# Patient Record
Sex: FEMALE | Race: WHITE
[De-identification: names, ages, dates, MRNs, and addresses within clinical notes are randomized per-mention and may not be internally consistent; named-entity substitution may affect disease eponyms.]

---
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
## 2020-11-01 ENCOUNTER — HOSPITAL ENCOUNTER (EMERGENCY)
Dept: HOSPITAL 47 - EC | Age: 20
Discharge: HOME | End: 2020-11-01
Payer: COMMERCIAL

## 2020-11-01 VITALS
DIASTOLIC BLOOD PRESSURE: 71 MMHG | HEART RATE: 72 BPM | TEMPERATURE: 98.1 F | SYSTOLIC BLOOD PRESSURE: 124 MMHG | RESPIRATION RATE: 16 BRPM
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
## 2020-11-01 DIAGNOSIS — Z79.3: ICD-10-CM

## 2020-11-01 DIAGNOSIS — S00.531A: ICD-10-CM

## 2020-11-01 DIAGNOSIS — S00.33XA: ICD-10-CM

## 2020-11-01 DIAGNOSIS — S90.31XA: ICD-10-CM

## 2020-11-01 DIAGNOSIS — S00.83XA: Primary | ICD-10-CM
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
## 2020-11-01 PROCEDURE — 96372 THER/PROPH/DIAG INJ SC/IM: CPT

## 2020-11-01 PROCEDURE — 70486 CT MAXILLOFACIAL W/O DYE: CPT

## 2020-11-01 PROCEDURE — 72125 CT NECK SPINE W/O DYE: CPT

## 2020-11-01 PROCEDURE — 99283 EMERGENCY DEPT VISIT LOW MDM: CPT

## 2020-11-01 PROCEDURE — 70450 CT HEAD/BRAIN W/O DYE: CPT
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
